# Patient Record
Sex: FEMALE
[De-identification: names, ages, dates, MRNs, and addresses within clinical notes are randomized per-mention and may not be internally consistent; named-entity substitution may affect disease eponyms.]

---

## 2021-08-22 ENCOUNTER — NURSE TRIAGE (OUTPATIENT)
Dept: OTHER | Facility: CLINIC | Age: 31
End: 2021-08-22

## 2021-08-22 NOTE — TELEPHONE ENCOUNTER
CORONAVIRUS (COVID-19) DIAGNOSED OR SUSPECTED-ADULT-    Brief description of triage: Linnette Diallo was tested positive for COVID on 8/17/2021 and has been running a fever since Wednesday. Caller has loss of taste and smell, fatigue, headache, cough, loss of appetite, cough, fever, chills and cough. Triage indicates for patient to See PCP within 24 hours. Care advice provided, patient verbalizes understanding; denies any other questions or concerns; instructed to call back for any new or worsening symptoms. Attention Provider: Thank you for allowing me to participate in the care of your patient. The patient was connected to triage in response to symptoms provided. Please do not respond through this encounter as the response is not directed to a shared pool.